# Patient Record
Sex: FEMALE | Race: BLACK OR AFRICAN AMERICAN
[De-identification: names, ages, dates, MRNs, and addresses within clinical notes are randomized per-mention and may not be internally consistent; named-entity substitution may affect disease eponyms.]

---

## 2019-08-05 ENCOUNTER — HOSPITAL ENCOUNTER (OUTPATIENT)
Dept: HOSPITAL 74 - FASU-ENDO | Age: 69
Discharge: HOME | End: 2019-08-05
Attending: INTERNAL MEDICINE
Payer: COMMERCIAL

## 2019-08-05 VITALS — HEART RATE: 64 BPM | SYSTOLIC BLOOD PRESSURE: 118 MMHG | DIASTOLIC BLOOD PRESSURE: 71 MMHG

## 2019-08-05 VITALS — BODY MASS INDEX: 31.8 KG/M2

## 2019-08-05 VITALS — TEMPERATURE: 97.5 F

## 2019-08-05 DIAGNOSIS — Z86.010: Primary | ICD-10-CM

## 2019-08-05 DIAGNOSIS — Z83.71: ICD-10-CM

## 2019-08-05 DIAGNOSIS — Z80.0: ICD-10-CM

## 2019-08-05 DIAGNOSIS — C18.7: ICD-10-CM

## 2019-08-05 PROCEDURE — 0DBN8ZX EXCISION OF SIGMOID COLON, VIA NATURAL OR ARTIFICIAL OPENING ENDOSCOPIC, DIAGNOSTIC: ICD-10-PCS | Performed by: INTERNAL MEDICINE

## 2019-08-07 NOTE — PATH
Surgical Pathology Report



Patient Name:  CHAITANYA OLMOS

Accession #:  [id number]

[facility]. Rec. #:  [id number]                                                        

   /Age/Gender:  1950 (Age: 68) / F

Account:  [id number]                                                          

             Location: [facility]

Taken:  2013

Received:  2019

Reported:  2019

Physicians:  Tavares Putnam M.D.

  



Specimen(s) Received

 HOT SNARE POLYPECTOMY @35CM 





Clinical History

Family history of colon cancer, personal history of colon polyps

Postoperative diagnosis: Colon polyp







Final Diagnosis

HOT SNARE POLYPECTOMY AT 35CM:

COLONIC MUCOSA SHOWING INTRAMUCOSAL ADENOCARCINOMA, IN A BACKGROUND OF

TUBULOVILLOUS ADENOMA.

THE RESECTION MARGIN IS NEGATVIE. THE CARCINOMA IS AT 4 MM FROM THE RESECTION

MARGIN.



Intradepartmental case reviewed with concordance on diagnosis. This case was

discussed with Dr. Putnam on 2019.

Immunohistochemistry for mismatch repair (MMR) proteinspending. An addendum

report to follow.





***Electronically Signed***

Mila Cruz M.D.



Addendum     

Reported: 2019



Addendum Diagnosis

Immunohistochemical stains for MisMatch Repair Protein Analysis performed at

[facility] in [place], [place] ([id number]) and interpreted at [facility] show the following:



RESULTS:

HMLH-1     INTACT NUCLEAR EXPRESSION

HMSH-2     INTACT NUCLEAR EXPRESSION

HMSH-6     INTACT NUCLEAR EXPRESSION

PMS2          INTACT NUCLEAR EXPRESSION



INTERPRETATION:  No loss of nuclear expression of MMR proteins: low probability

of microsatellite instability-high (MSI-H)







 Caitlin Almeida M.D.  

 



Gross Description

Received in formalin labeled "hot snare polypectomy at 35 cm," is a 1.0 x 1.0 x

0.6 cm tan, polypoid portion of soft tissue. The specimen is bisected and

entirely submitted in one cassette.





saudi

## 2021-05-03 ENCOUNTER — HOSPITAL ENCOUNTER (OUTPATIENT)
Dept: HOSPITAL 74 - FASU-ENDO | Age: 71
Discharge: HOME | End: 2021-05-03
Attending: INTERNAL MEDICINE
Payer: COMMERCIAL

## 2021-05-03 VITALS — HEART RATE: 66 BPM | SYSTOLIC BLOOD PRESSURE: 114 MMHG | DIASTOLIC BLOOD PRESSURE: 72 MMHG

## 2021-05-03 VITALS — BODY MASS INDEX: 33.9 KG/M2

## 2021-05-03 VITALS — TEMPERATURE: 98.2 F

## 2021-05-03 DIAGNOSIS — Z12.11: Primary | ICD-10-CM

## 2021-05-03 DIAGNOSIS — Z86.010: ICD-10-CM

## 2021-05-03 DIAGNOSIS — Z80.0: ICD-10-CM

## 2021-05-03 PROCEDURE — 0DJD8ZZ INSPECTION OF LOWER INTESTINAL TRACT, VIA NATURAL OR ARTIFICIAL OPENING ENDOSCOPIC: ICD-10-PCS | Performed by: INTERNAL MEDICINE
